# Patient Record
Sex: MALE | Race: BLACK OR AFRICAN AMERICAN | NOT HISPANIC OR LATINO | Employment: OTHER | URBAN - METROPOLITAN AREA
[De-identification: names, ages, dates, MRNs, and addresses within clinical notes are randomized per-mention and may not be internally consistent; named-entity substitution may affect disease eponyms.]

---

## 2022-08-15 ENCOUNTER — APPOINTMENT (EMERGENCY)
Dept: RADIOLOGY | Facility: HOSPITAL | Age: 27
End: 2022-08-15
Payer: COMMERCIAL

## 2022-08-15 ENCOUNTER — HOSPITAL ENCOUNTER (EMERGENCY)
Facility: HOSPITAL | Age: 27
Discharge: HOME | End: 2022-08-15
Attending: EMERGENCY MEDICINE
Payer: COMMERCIAL

## 2022-08-15 VITALS
RESPIRATION RATE: 18 BRPM | WEIGHT: 170 LBS | HEIGHT: 68 IN | DIASTOLIC BLOOD PRESSURE: 59 MMHG | OXYGEN SATURATION: 99 % | TEMPERATURE: 98 F | HEART RATE: 78 BPM | BODY MASS INDEX: 25.76 KG/M2 | SYSTOLIC BLOOD PRESSURE: 104 MMHG

## 2022-08-15 DIAGNOSIS — M54.50 LOW BACK PAIN: ICD-10-CM

## 2022-08-15 DIAGNOSIS — H10.023 ACUTE PURULENT CONJUNCTIVITIS, BILATERAL: Primary | ICD-10-CM

## 2022-08-15 DIAGNOSIS — R51.9 HEADACHE: ICD-10-CM

## 2022-08-15 PROCEDURE — 72100 X-RAY EXAM L-S SPINE 2/3 VWS: CPT

## 2022-08-15 PROCEDURE — 96375 TX/PRO/DX INJ NEW DRUG ADDON: CPT

## 2022-08-15 PROCEDURE — 99283 EMERGENCY DEPT VISIT LOW MDM: CPT

## 2022-08-15 PROCEDURE — 99284 EMERGENCY DEPT VISIT MOD MDM: CPT

## 2022-08-15 PROCEDURE — 96361 HYDRATE IV INFUSION ADD-ON: CPT

## 2022-08-15 PROCEDURE — 96374 THER/PROPH/DIAG INJ IV PUSH: CPT

## 2022-08-15 RX ORDER — DIPHENHYDRAMINE HYDROCHLORIDE 50 MG/ML
25 INJECTION INTRAMUSCULAR; INTRAVENOUS ONCE
Status: COMPLETED | OUTPATIENT
Start: 2022-08-15 | End: 2022-08-15

## 2022-08-15 RX ORDER — METOCLOPRAMIDE HYDROCHLORIDE 5 MG/ML
10 INJECTION INTRAMUSCULAR; INTRAVENOUS ONCE
Status: COMPLETED | OUTPATIENT
Start: 2022-08-15 | End: 2022-08-15

## 2022-08-15 RX ORDER — NAPROXEN 500 MG/1
500 TABLET ORAL 2 TIMES DAILY WITH MEALS
Qty: 14 TABLET | Refills: 0 | Status: SHIPPED | OUTPATIENT
Start: 2022-08-15

## 2022-08-15 RX ORDER — LIDOCAINE 50 MG/G
1 PATCH TOPICAL DAILY
Qty: 6 PATCH | Refills: 0 | Status: SHIPPED | OUTPATIENT
Start: 2022-08-15

## 2022-08-15 RX ORDER — KETOROLAC TROMETHAMINE 30 MG/ML
15 INJECTION, SOLUTION INTRAMUSCULAR; INTRAVENOUS ONCE
Status: COMPLETED | OUTPATIENT
Start: 2022-08-15 | End: 2022-08-15

## 2022-08-15 RX ORDER — LIDOCAINE 50 MG/G
1 PATCH TOPICAL ONCE
Status: DISCONTINUED | OUTPATIENT
Start: 2022-08-15 | End: 2022-08-15 | Stop reason: HOSPADM

## 2022-08-15 RX ORDER — OFLOXACIN 3 MG/ML
1 SOLUTION/ DROPS OPHTHALMIC ONCE
Status: COMPLETED | OUTPATIENT
Start: 2022-08-15 | End: 2022-08-15

## 2022-08-15 RX ADMIN — OFLOXACIN 1 DROP: 3 SOLUTION/ DROPS OPHTHALMIC at 12:30

## 2022-08-15 RX ADMIN — SODIUM CHLORIDE 1000 ML: 0.9 INJECTION, SOLUTION INTRAVENOUS at 12:42

## 2022-08-15 RX ADMIN — METOCLOPRAMIDE 10 MG: 5 INJECTION, SOLUTION INTRAMUSCULAR; INTRAVENOUS at 12:29

## 2022-08-15 RX ADMIN — LIDOCAINE 5% 1 PATCH: 700 PATCH TOPICAL at 12:30

## 2022-08-15 RX ADMIN — DIPHENHYDRAMINE HYDROCHLORIDE 25 MG: 50 INJECTION, SOLUTION INTRAMUSCULAR; INTRAVENOUS at 12:30

## 2022-08-15 RX ADMIN — KETOROLAC TROMETHAMINE 15 MG: 30 INJECTION, SOLUTION INTRAMUSCULAR at 12:30

## 2022-08-15 NOTE — ED PROVIDER NOTES
History  Chief Complaint   Patient presents with    Eye Pain     B/l eye pain and redness with tearing and dc x 2 days; also c/o back pain, +photophobia      Patient is a 23year old male presenting to the ED for evaluation of bilateral eye pain  Reports 2 days ago having a red painful right eye  Reports it had watery and purulent discharge and has spread to the left eye  Reports the yes are sensitive to light, reports wearing sunglasses helps  Reports having an associated generalized headache this morning  Reports noticing his low back towards the right hurt last night into this morning  Reports Friday he was running when he tumbled to the ground  Reports smoking marijuana last night and this morning without relief of the pain of his eyes, back or head  Reports the back pain is sharp and constant, worse with movement  Denies bladder or bowel habit changes, bladder or bowel incontinence, or saddle anesthesia  Denies fevers, rash, weakness, dizziness, abdominal pain, nausea, vomiting, diarrhea, constipation, chest pain, shortness of breath or difficulty breathing  Does not offer any other concerns or complaints  History provided by:  Patient   used: No    Eye Pain  Location:  Bilateral  Onset quality:  Sudden  Duration:  2 days  Timing:  Constant  Progression:  Worsening  Relieved by:  Closing eyes, sunglass  Worsened by:  Lights  Ineffective treatments:  Smoking marijuana  Associated symptoms: headaches    Associated symptoms: no abdominal pain, no chest pain, no congestion, no cough, no diarrhea, no ear pain, no fatigue, no fever, no loss of consciousness, no myalgias, no nausea, no rash, no rhinorrhea, no shortness of breath, no sore throat, no vomiting and no wheezing    Headaches:     Duration:  1 day    Timing:  Constant    Progression:  Unchanged      None       No past medical history on file  No past surgical history on file  No family history on file    I have reviewed and agree with the history as documented  No existing history information found  No existing history information found  Review of Systems   Constitutional: Negative for chills, fatigue and fever  HENT: Negative for congestion, ear pain, rhinorrhea and sore throat  Eyes: Positive for pain  Negative for visual disturbance  Respiratory: Negative for cough, shortness of breath and wheezing  Cardiovascular: Negative for chest pain and palpitations  Gastrointestinal: Negative for abdominal pain, diarrhea, nausea and vomiting  Genitourinary: Negative for dysuria and hematuria  Musculoskeletal: Positive for back pain (lumbar)  Negative for arthralgias and myalgias  Skin: Negative for color change and rash  Neurological: Positive for headaches  Negative for seizures, loss of consciousness and syncope  All other systems reviewed and are negative  Physical Exam  Physical Exam  Vitals and nursing note reviewed  Constitutional:       General: He is awake  Appearance: Normal appearance  He is not ill-appearing, toxic-appearing or diaphoretic  HENT:      Head: Normocephalic and atraumatic  Right Ear: External ear normal       Left Ear: External ear normal       Nose: Nose normal       Mouth/Throat:      Mouth: Mucous membranes are moist    Eyes:      General: Lids are normal  No scleral icterus  Right eye: Discharge present  No foreign body or hordeolum  Left eye: Discharge present  No foreign body or hordeolum  Extraocular Movements: Extraocular movements intact  Conjunctiva/sclera:      Right eye: Right conjunctiva is injected  No chemosis  Left eye: Left conjunctiva is injected  No chemosis  Pupils: Pupils are equal, round, and reactive to light  Comments: Yellow purulent discharge on the eyelashes on both eyes  Injected at the medical aspect of both eyes  Photophobia   Cardiovascular:      Rate and Rhythm: Normal rate     Pulmonary: Effort: Pulmonary effort is normal  No respiratory distress  Abdominal:      General: Bowel sounds are normal       Tenderness: There is no abdominal tenderness  Musculoskeletal:         General: No swelling, deformity or signs of injury  Normal range of motion  Cervical back: Normal, normal range of motion and neck supple  No rigidity  Thoracic back: Normal       Lumbar back: Normal  No tenderness or bony tenderness  Negative right straight leg raise test and negative left straight leg raise test       Comments: No tenderness on palpation of the lumbar spine or paraspinal region   Skin:     General: Skin is warm and dry  Coloration: Skin is not jaundiced  Findings: No erythema or rash  Neurological:      General: No focal deficit present  Mental Status: He is alert and oriented to person, place, and time  Mental status is at baseline  Cranial Nerves: No cranial nerve deficit  Gait: Gait normal    Psychiatric:         Mood and Affect: Mood normal          Behavior: Behavior normal          Thought Content:  Thought content normal          Judgment: Judgment normal          Vital Signs  ED Triage Vitals [08/15/22 1010]   Temperature Pulse Respirations Blood Pressure SpO2   98 °F (36 7 °C) 78 18 104/59 99 %      Temp src Heart Rate Source Patient Position - Orthostatic VS BP Location FiO2 (%)   -- -- -- -- --      Pain Score       --           Vitals:    08/15/22 1010   BP: 104/59   Pulse: 78         Visual Acuity      ED Medications  Medications   lidocaine (LIDODERM) 5 % patch 1 patch (1 patch Topical Medication Applied 8/15/22 1230)   metoclopramide (REGLAN) injection 10 mg (10 mg Intravenous Given 8/15/22 1229)   ketorolac (TORADOL) injection 15 mg (15 mg Intravenous Given 8/15/22 1230)   diphenhydrAMINE (BENADRYL) injection 25 mg (25 mg Intravenous Given 8/15/22 1230)   ofloxacin (OCUFLOX) 0 3 % ophthalmic solution 1 drop (1 drop Both Eyes Given 8/15/22 1230)   sodium chloride 0 9 % bolus 1,000 mL (1,000 mL Intravenous New Bag 8/15/22 1242)       Diagnostic Studies  Results Reviewed     None                 XR lumbar spine 2 or 3 views   Final Result by Melanie Mckeon MD (08/15 1225)      No acute lumbar spinal abnormalities         Workstation performed: [id number]                    Procedures  Procedures         ED Course  ED Course as of 08/15/22 1427   Mon Aug 15, 2022   1307 Reports his back pain is alleviated slightly   1351 Reports his headache and back pain have resolved                   MDM  Number of Diagnoses or Management Options  Acute purulent conjunctivitis, bilateral: new and requires workup  Headache: new and requires workup  Low back pain: new and requires workup  Diagnosis management comments: This is a 23year old male presenting to the ED for evaluation of bilateral eye pain  Reports the pain began 2 days ago in the right and has spread to the left  Reports the eyes both have discharge and tearing daily  Reports having an associated generalized headache that began this morning  Reports having lumbar back pain that began last night  Reports taking a tumble on Friday while running  Reports the back pain does not radiate  Denies bladder or bowel habit changes, bladder or bowel incontinence, or saddle anesthesia  Denies fevers, rash, weakness, dizziness, abdominal pain, vomiting, diarrhea, constipation, chest pain, shortness of breath or difficulty breathing  Differential diagnosis to include but is not limited to: bacterial conjunctivitis, viral conjunctivitis, headache, migraine, muscle strain, sprain, degenerative changes    Initial ED Plan: xray lumbar, benadryl, toradol, zofran, lidoderm patch    ED results: bilateral conjunctivitis, headache and back pain alleviated with medications    Final ED assessment: Patient is stable and well appearing  Discussed follow up with PCP and ophthalmology  Discussed ophthalmic drops as directed   Discussed lidoderm and naproxen as needed for pain  Strict return precautions were discussed including but not limited to worsening pain, visual changes, fevers, bladder or bowel incontinence, numbness or tingling, weakness  Patient verbalized understanding and is agreeable with the plan for discharge  Amount and/or Complexity of Data Reviewed  Clinical lab tests: ordered and reviewed  Tests in the radiology section of CPT®: ordered and reviewed  Tests in the medicine section of CPT®: ordered and reviewed  Independent visualization of images, tracings, or specimens: yes        Disposition  Final diagnoses:   Acute purulent conjunctivitis, bilateral   Headache   Low back pain     Time reflects when diagnosis was documented in both MDM as applicable and the Disposition within this note     Time User Action Codes Description Comment    8/15/2022  1:52 PM Buzzy Ingles Add [O74 910] Acute purulent conjunctivitis, bilateral     8/15/2022  1:52 PM Buzzy Ingles Add [R51 9] Headache     8/15/2022  1:52 PM Buzzy Ingles Add [M54 50] Low back pain       ED Disposition     ED Disposition   Discharge    Condition   Stable    Date/Time   Mon Aug 15, 2022  1:55 PM    Comment   Maranda Mason discharge to home/self care                 Follow-up Information     Follow up With Specialties Details Why Contact Info Additional [address] [facility], [address] [facility] Internal Medicine Call in 3 days For follow up [address] [facility] [address] [address] Ophthalmology Call in 3 days For follow up Renee [address] [place] [address] Emergency Department Emergency Medicine Go to  If symptoms worsen [address] Emergency Department, [address] [place], [place], [facility]          Patient's Medications   Discharge Prescriptions    LIDOCAINE ([address]) 5 %    Apply 1 patch topically daily Remove & Discard patch within 12 hours or as directed by MD       Start Date: 8/15/2022 End Date: --       Order Dose: 1 patch       Quantity: 6 patch    Refills: 0    NAPROXEN (NAPROSYN) 500 MG TABLET    Take 1 tablet (500 mg total) by mouth 2 (two) times a day with meals       Start Date: 8/15/2022 End Date: --       Order Dose: 500 mg       Quantity: 14 tablet    Refills: 0       No discharge procedures on file      PDMP Review     None          ED Provider  Electronically Signed by           Kierra Bell PA-C  08/15/22 5175

## 2022-08-15 NOTE — DISCHARGE INSTRUCTIONS
Follow up with PCP  Follow up with ophthalmologist  Use 1-2 ofloxacin drops every 2-4 hours while awake for 2 days, use 4 times a day for 5 days  Naproxen as needed for headache/back pain  Lidoderm patches as needed for back pain  Return to the ED with new or worsening symptoms including but not limited to worsening pain, visual changes, bladder/bowel incontinence, numbness/tingling, weakness, dizziness